# Patient Record
Sex: MALE | Race: WHITE | HISPANIC OR LATINO | Employment: FULL TIME | URBAN - METROPOLITAN AREA
[De-identification: names, ages, dates, MRNs, and addresses within clinical notes are randomized per-mention and may not be internally consistent; named-entity substitution may affect disease eponyms.]

---

## 2024-04-25 ENCOUNTER — OFFICE VISIT (OUTPATIENT)
Dept: PODIATRY | Facility: CLINIC | Age: 29
End: 2024-04-25

## 2024-04-25 VITALS — BODY MASS INDEX: 26.78 KG/M2 | HEIGHT: 67 IN | WEIGHT: 170.63 LBS

## 2024-04-25 DIAGNOSIS — L60.0 INGROWN NAIL: Primary | ICD-10-CM

## 2024-04-25 PROCEDURE — 99203 OFFICE O/P NEW LOW 30 MIN: CPT | Mod: 25,S$GLB,, | Performed by: PODIATRIST

## 2024-04-25 PROCEDURE — 11730 AVULSION NAIL PLATE SIMPLE 1: CPT | Mod: T6,S$GLB,, | Performed by: PODIATRIST

## 2024-04-25 NOTE — LETTER
April 25, 2024      [facility] - Podiatry  [address], [address]  [place] [address]  Phone: [phone]       Patient: Tony Horton   YOB: 1995  Date of Visit: 04/25/2024    To Whom It May Concern:    Saida Horton  was at [facility] on 04/25/2024. The patient may return to work/school on 04/29/2024 with no restrictions. If you have any questions or concerns, or if I can be of further assistance, please do not hesitate to contact me.    Sincerely,      Danielle Warren MA

## 2024-04-25 NOTE — LETTER
April 25, 2024      [facility] - Podiatry  [address], [address]  [place] [address]  Phone: [phone]       Patient: Tony Horton   YOB: 1995  Date of Visit: 04/25/2024    To Whom It May Concern:    Saida Horton  was at [facility] on 04/25/2024. The patient may return to work/school on 04/26/2024 with no restrictions. If you have any questions or concerns, or if I can be of further assistance, please do not hesitate to contact me.    Sincerely,      Danielle Warren MA

## 2024-04-25 NOTE — PROGRESS NOTES
Subjective:     Patient ID: Tony Horton is a 28 y.o. male.    Chief Complaint: Nail Problem    Tony is a 28 y.o. male who presents to the clinic complaining of painful ingrown toenail on the left foot.  Patient presents to clinic complaining of 1-2 week duration pain tenderness from left foot lateral nail border.  Patient denies any known trauma to the area.  Patient states the areas very uncomfortable wearing closed in shoe gear.    Review of Systems   Constitutional: Negative for chills and fever.   Cardiovascular:  Negative for chest pain and leg swelling.   Respiratory:  Negative for cough and shortness of breath.    Gastrointestinal:  Negative for diarrhea, nausea and vomiting.        Objective:     Physical Exam  Vitals reviewed.   Constitutional:       General: He is not in acute distress.     Appearance: Normal appearance. He is not ill-appearing.   HENT:      Head: Normocephalic.      Nose: Nose normal.   Cardiovascular:      Rate and Rhythm: Normal rate.   Pulmonary:      Effort: Pulmonary effort is normal. No respiratory distress.   Skin:     Capillary Refill: Capillary refill takes 2 to 3 seconds.   Neurological:      Mental Status: He is alert and oriented to person, place, and time.   Psychiatric:         Mood and Affect: Mood normal.         Behavior: Behavior normal.         Thought Content: Thought content normal.     Neurologic:  Protective and light touch sensation intact bilateral lower extremity  Musculoskeletal:  5/5 muscle strength noted bilateral foot, ankle joint range of motion is full without pain, pain and tenderness with patient of left 1st digit lateral nail border at site of ingrown toenail   Dermatologic:  Incurvated left 1st digit lateral nail border with erythema and swelling at the proximal nail base laterally, no open lesions noted bilateral foot, no rashes noted bilateral foot, no interdigital maceration noted bilateral foot   Vascular: DP and PT pulses palpable 2/4 bilateral  foot, capillary fill time less than 3 seconds distal aspect of the digits bilateral foot      Assessment:      Encounter Diagnosis   Name Primary?    Ingrown nail Yes     Plan:     Tony was seen today for nail problem.    Diagnoses and all orders for this visit:    Ingrown nail  -     Nail Removal      I counseled the patient on his conditions, their implications and medical management.        1. Patient was examined and evaluated.    2. Discussed patient etiology of ingrown toenail.  Patient was advised against over aggressive nail trimming and use of ill-fitting shoes.  Discussed with patient partial nonpermanent versus partial permanent nail avulsions.  Aftercare instructions were discussed in detail with the patient.  Aftercare instructions were then printed dispensed to the patient along with dressing supplies.  Patient was advised to adjunct with OTC analgesics pain relief.  Patient will continue with comfortable shoe gear both inside and outside the home  Nail Removal    Date/Time: 4/25/2024 1:15 PM    Performed by: Lazarus Ribeiro DPM  Authorized by: Lazarus Ribeiro DPM    Consent Done?:  Yes (Written)  Prep: patient was prepped and draped in usual sterile fashion    Location:     Location:  Left foot    Location detail:  Left big toe  Anesthesia:     Anesthesia:  Digital block    Local anesthetic:  Lidocaine 1% without epinephrine    Anesthetic total (ml):  4  Procedure Details:     Preparation:  Skin prepped with Hibeclense    Amount removed:  Partial    Side:  Lateral    Wedge excision of skin of nail fold: No      Nail bed sutured?: No      Nail matrix removed:  None    Removed nail replaced and anchored: No      Dressing applied:  4x4, antibiotic ointment and dressing applied    Patient tolerance:  Patient tolerated the procedure well with no immediate complications       3. Patient will follow-up p.r.n. for complaints